# Patient Record
Sex: MALE | Race: WHITE | NOT HISPANIC OR LATINO | RURAL
[De-identification: names, ages, dates, MRNs, and addresses within clinical notes are randomized per-mention and may not be internally consistent; named-entity substitution may affect disease eponyms.]

---

## 2017-02-17 ENCOUNTER — OFFICE VISIT (OUTPATIENT)
Dept: RETAIL CLINIC | Facility: CLINIC | Age: 47
End: 2017-02-17

## 2017-02-17 VITALS
HEIGHT: 69 IN | HEART RATE: 110 BPM | TEMPERATURE: 98.5 F | BODY MASS INDEX: 38.95 KG/M2 | RESPIRATION RATE: 16 BRPM | OXYGEN SATURATION: 98 % | WEIGHT: 263 LBS

## 2017-02-17 DIAGNOSIS — L50.9 HIVES: Primary | ICD-10-CM

## 2017-02-17 PROCEDURE — 99202 OFFICE O/P NEW SF 15 MIN: CPT | Performed by: NURSE PRACTITIONER

## 2017-02-17 RX ORDER — PREDNISONE 10 MG/1
TABLET ORAL DAILY
Qty: 21 EACH | Refills: 0 | Status: SHIPPED | OUTPATIENT
Start: 2017-02-17 | End: 2017-02-23

## 2017-02-17 NOTE — PROGRESS NOTES
"Subjective   Vineet Moreno is a 46 y.o. male.     Urticaria   This is a new problem. The current episode started yesterday. The problem has been waxing and waning since onset. Location: bilat hands, upper legs and face. The rash is characterized by redness, itchiness and swelling. It is unknown if there was an exposure to a precipitant. Pertinent negatives include no fever. Past treatments include antihistamine. The treatment provided mild relief. His past medical history is significant for allergies. There is no history of asthma or eczema.        The following portions of the patient's history were reviewed and updated as appropriate: allergies, current medications, past family history, past medical history, past social history, past surgical history and problem list.    Review of Systems   Constitutional: Negative.  Negative for fever.   Respiratory: Negative.    Cardiovascular: Negative.    Skin: Positive for rash (began on hands, spread to upper thighs and face, improved with benadryl, but returned this morning).        Visit Vitals   • Pulse 110   • Temp 98.5 °F (36.9 °C)   • Resp 16   • Ht 69\" (175.3 cm)   • Wt 263 lb (119 kg)   • SpO2 98%   • BMI 38.84 kg/m2        Objective   Physical Exam   Constitutional: He is oriented to person, place, and time. He appears well-developed and well-nourished. No distress.   Pulmonary/Chest: Effort normal and breath sounds normal.   Neurological: He is alert and oriented to person, place, and time.   Skin: Skin is warm and dry. Rash (multiple macular lesion on bilat hand and forehead, blanching, no exudate or other sign of infection) noted.        Psychiatric: He has a normal mood and affect. His behavior is normal. Thought content normal.   Vitals reviewed.      Assessment/Plan   Vineet was seen today for urticaria.    Diagnoses and all orders for this visit:    Hives  -     PredniSONE (DELTASONE) 10 MG (21) tablet pack; Take  by mouth Daily for 6 days.               "

## 2017-02-17 NOTE — PATIENT INSTRUCTIONS
Hives  Hives are itchy, red, swollen areas of the skin. They can vary in size and location on your body. Hives can come and go for hours or several days (acute hives) or for several weeks (chronic hives). Hives do not spread from person to person (noncontagious). They may get worse with scratching, exercise, and emotional stress.  CAUSES   · Allergic reaction to food, additives, or drugs.  · Infections, including the common cold.  · Illness, such as vasculitis, lupus, or thyroid disease.  · Exposure to sunlight, heat, or cold.  · Exercise.  · Stress.  · Contact with chemicals.  SYMPTOMS   · Red or white swollen patches on the skin. The patches may change size, shape, and location quickly and repeatedly.  · Itching.  · Swelling of the hands, feet, and face. This may occur if hives develop deeper in the skin.  DIAGNOSIS   Your caregiver can usually tell what is wrong by performing a physical exam. Skin or blood tests may also be done to determine the cause of your hives. In some cases, the cause cannot be determined.  TREATMENT   Mild cases usually get better with medicines such as antihistamines. Severe cases may require an emergency epinephrine injection. If the cause of your hives is known, treatment includes avoiding that trigger.   HOME CARE INSTRUCTIONS   · Avoid causes that trigger your hives.  · Take antihistamines as directed by your caregiver to reduce the severity of your hives. Non-sedating or low-sedating antihistamines are usually recommended. Do not drive while taking an antihistamine.  · Take any other medicines prescribed for itching as directed by your caregiver.  · Wear loose-fitting clothing.  · Keep all follow-up appointments as directed by your caregiver.  SEEK MEDICAL CARE IF:   · You have persistent or severe itching that is not relieved with medicine.  · You have painful or swollen joints.  SEEK IMMEDIATE MEDICAL CARE IF:   · You have a fever.  · Your tongue or lips are swollen.  · You have  trouble breathing or swallowing.  · You feel tightness in the throat or chest.  · You have abdominal pain.  These problems may be the first sign of a life-threatening allergic reaction. Call your local emergency services (911 in [place]  MAKE SURE YOU:   · Understand these instructions.  · Will watch your condition.  · Will get help right away if you are not doing well or get worse.     This information is not intended to replace advice given to you by your health care provider. Make sure you discuss any questions you have with your health care provider.     Document Released: 12/18/2006 Document Revised: 12/23/2014 Document Reviewed: 10/05/2016  [employer] Interactive Patient Education ©2016 [employer]

## 2023-03-16 ENCOUNTER — OFFICE VISIT (OUTPATIENT)
Dept: FAMILY MEDICINE CLINIC | Facility: CLINIC | Age: 53
End: 2023-03-16
Payer: COMMERCIAL

## 2023-03-16 VITALS
TEMPERATURE: 97.2 F | HEIGHT: 69 IN | OXYGEN SATURATION: 97 % | WEIGHT: 250.2 LBS | HEART RATE: 88 BPM | SYSTOLIC BLOOD PRESSURE: 147 MMHG | DIASTOLIC BLOOD PRESSURE: 94 MMHG | BODY MASS INDEX: 37.06 KG/M2

## 2023-03-16 DIAGNOSIS — R03.0 ELEVATED BLOOD PRESSURE READING IN OFFICE WITHOUT DIAGNOSIS OF HYPERTENSION: ICD-10-CM

## 2023-03-16 DIAGNOSIS — Z87.39 HISTORY OF GOUT: ICD-10-CM

## 2023-03-16 DIAGNOSIS — Z12.11 COLON CANCER SCREENING: ICD-10-CM

## 2023-03-16 DIAGNOSIS — Z11.59 NEED FOR HEPATITIS C SCREENING TEST: ICD-10-CM

## 2023-03-16 DIAGNOSIS — K92.1 HEMATOCHEZIA: ICD-10-CM

## 2023-03-16 DIAGNOSIS — B35.6 TINEA CRURIS: ICD-10-CM

## 2023-03-16 DIAGNOSIS — Z82.69 FAMILY HISTORY OF GOUT: ICD-10-CM

## 2023-03-16 DIAGNOSIS — Z72.0 SMOKELESS TOBACCO USE: ICD-10-CM

## 2023-03-16 DIAGNOSIS — Z01.818 PREOP EXAMINATION: Primary | ICD-10-CM

## 2023-03-16 DIAGNOSIS — E66.8 MODERATE OBESITY: ICD-10-CM

## 2023-03-16 DIAGNOSIS — K64.8 INTERNAL HEMORRHOIDS: ICD-10-CM

## 2023-03-16 DIAGNOSIS — F10.10 ALCOHOL CONSUMPTION BINGE DRINKING: ICD-10-CM

## 2023-03-16 PROCEDURE — 46600 DIAGNOSTIC ANOSCOPY SPX: CPT | Performed by: INTERNAL MEDICINE

## 2023-03-16 PROCEDURE — 99204 OFFICE O/P NEW MOD 45 MIN: CPT | Performed by: INTERNAL MEDICINE

## 2023-03-16 PROCEDURE — 93000 ELECTROCARDIOGRAM COMPLETE: CPT | Performed by: INTERNAL MEDICINE

## 2023-03-16 RX ORDER — CLOTRIMAZOLE 1 %
1 CREAM (GRAM) TOPICAL 2 TIMES DAILY
Qty: 60 G | Refills: 1 | Status: SHIPPED | OUTPATIENT
Start: 2023-03-16

## 2023-03-16 RX ORDER — HYDROCORTISONE 25 MG/G
CREAM TOPICAL
Qty: 30 G | Refills: 0 | Status: SHIPPED | OUTPATIENT
Start: 2023-03-16

## 2023-03-16 NOTE — PROGRESS NOTES
Follow Up Office Visit      Date: 2023   Patient Name: Vineet Moreno  : 1970   MRN: [id number]     Chief Complaint:    Chief Complaint   Patient presents with   • Rectal Bleeding     Seems bright red. More frequent. Has seen blood in his stool       History of Present Illness: Vineet Moreno is a 52 y.o. male who is here today for introductory visit, having 2 primary concerns, 1 of which is a history off-and-on of having some hematochezia attributed to internal hemorrhoids, having had more consistent bleeding noted upon wiping with toilet paper after bowel movements for the last 3 months, but in the last month also noticing some blood mixed into the stool.  There is no pain with defecation, no abdominal pain other than occasionally has some cramps which she has had off and on for many years and for which she takes a probiotic, no nausea or vomiting, no fevers or chills, no unintended weight loss.  No family history of colon cancer or inflammatory bowel disease.  Unrelated problem relates to an itchy rash in his groin, for which he has not treated with anything.  No other acute problems identified by patient.  We did discuss his obesity, patient working from home being relatively sedentary with suboptimal diet.  Also noted his blood pressure elevated here in the office with no history of formal diagnosis of hypertension though has noted in medical care settings it always has been a little bit elevated which he attributed to stressful circumstances.  He does dip tobacco at 1 can daily for 30 years, and drinks a total of 27 beers through the course of the week, typically on weekends.    Subjective      Review of Systems:   Review of Systems    I have reviewed the patients family history, social history, past medical history, past surgical history and have updated it as appropriate.     Medications:     Current Outpatient Medications:   •  clotrimazole (LOTRIMIN) 1 % cream, Apply 1 application  "topically to the appropriate area as directed 2 (Two) Times a Day. As needed for yeast infection, Disp: 60 g, Rfl: 1  •  hydrocortisone 2.5 % cream, Apply 1 application topically to the appropriate area as directed 2 (Two) Times a Day. As needed for itch, Disp: 60 g, Rfl: 1  •  Hydrocortisone, Perianal, (ANUSOL-HC) 2.5 % rectal cream, Insert 1 g intrarectally twice daily for 15 days, Disp: 30 g, Rfl: 0    Allergies:   No Known Allergies    Objective     Physical Exam: Please see above  Vital Signs:   Vitals:    03/16/23 0906   BP: 147/94   BP Location: Right arm   Patient Position: Sitting   Cuff Size: Adult   Pulse: 88   Temp: 97.2 °F (36.2 °C)   TempSrc: Temporal   SpO2: 97%   Weight: 113 kg (250 lb 3.2 oz)   Height: 175.3 cm (69\")     Body mass index is 36.95 kg/m².  Class 2 Severe Obesity (BMI >=35 and <=39.9). Obesity-related health conditions include the following: Elevated blood pressure. Obesity is unchanged. BMI is is above average; BMI management plan is completed. We discussed portion control and increasing exercise.       Physical Exam  General: Very pleasant average statured healthy-appearing 52-year-old white male with a BMI of 36.9.  No prior BMI or weight comparison.  Head and neck: Moderate male pattern baldness with closely shaven hair, beard, oropharynx is clear with good dentition and moist membranes, neck supple with no lymphadenopathy thyromegaly masses or tenderness  Lungs are clear with no wheeze tachypnea or cough  Cardiac regular rate rhythm with no murmurs gallops or rubs  Abdomen with moderate angiogenic obesity soft and nontender with no organomegaly or masses and normal bowel sounds   exam with normal circumcised male, testes descended bilaterally with no nodules or tenderness, does have inguinal rash with erythema and some scaliness consistent with a yeast dermatitis bilaterally  Anal exam with no external hemorrhoids, digital exam with normal sphincter tone, no unusual discomfort or " palpable lesions, prostate mildly enlarged unremarkable for age with no focal nodules or tenderness  Endoscopy, Anus    Date/Time: 3/16/2023 9:48 AM  Performed by: Bill Jaffe MD  Authorized by: Bill Jaffe MD   Preparation: Patient was prepped and draped in the usual sterile fashion.  Local anesthesia used: no    Anesthesia:  Local anesthesia used: no    Sedation:  Patient sedated: no    Patient tolerance: patient tolerated the procedure well with no immediate complications  Comments: Anoscopy performed with moderately inflamed none actively bleeding internal hemorrhoids noted, no mucosal abnormalities noted otherwise, tolerated well with no immediate complications      ECG 12 Lead    Date/Time: 3/16/2023 9:49 AM  Performed by: Bill Jaffe MD  Authorized by: Bill Jaffe MD   Comparison: not compared with previous ECG   Comments: Normal sinus rhythm rate 79 with no acute or chronic abnormalities identified, no prior tracing for comparison            Results:   Labs:   No results found for: HGBA1C, CMP, CBCDIFFPANEL, CREAT, TSH     POCT Results (if applicable):   No results found for this or any previous visit.    Imaging:   No valid procedures specified.       Assessment / Plan      Assessment/Plan:   Diagnoses and all orders for this visit:    1. Preop examination (Primary)  -     ECG 12 Lead  -     CBC & Differential; Future  -     Comprehensive Metabolic Panel; Future  Patient being referred for screening colonoscopy, never having had prior study, also having some acute hematochezia which is likely due to internal hemorrhoids but need to review to make sure no other abnormalities noted.  Basic screening labs.  EKG unremarkable  2. Hematochezia  -     Ambulatory Referral For Screening Colonoscopy  -     Endoscopy, Anus  -     Hydrocortisone, Perianal, (ANUSOL-HC) 2.5 % rectal cream; Insert 1 g intrarectally twice daily for 15 days  Dispense: 30 g; Refill: 0  Internal hemorrhoids noted on  anoscopy today here in the office, treat with Anusol cream twice daily x2 weeks.  Advised if not improving.  Referring for colonoscopy as noted.     3. Internal hemorrhoids  -     Ambulatory Referral For Screening Colonoscopy  -     Endoscopy, Anus  -     Hydrocortisone, Perianal, (ANUSOL-HC) 2.5 % rectal cream; Insert 1 g intrarectally twice daily for 15 days  Dispense: 30 g; Refill: 0  Treating with Anusol rectal cream as noted.  Colonoscopy referral pending.  4. Tinea cruris  -     clotrimazole (LOTRIMIN) 1 % cream; Apply 1 application topically to the appropriate area as directed 2 (Two) Times a Day. As needed for yeast infection  Dispense: 60 g; Refill: 1  -     hydrocortisone 2.5 % cream; Apply 1 application topically to the appropriate area as directed 2 (Two) Times a Day. As needed for itch  Dispense: 60 g; Refill: 1  Treat with clotrimazole for his tinea cruris, adding hydrocortisone for secondary itch.  5. Elevated blood pressure reading in office without diagnosis of hypertension  -     ECG 12 Lead  -     TSH Rfx On Abnormal To Free T4; Future  -     Hemoglobin A1c; Future  -     Lipid Panel; Future  -     Urinalysis With Culture If Indicated -; Future  EKG today unremarkable.  Start monitoring blood pressures at least twice weekly with ideal parameters discussed, initiating healthy lifestyle with diet exercise avoidance of salt and attempts at weight loss.  Reassess blood pressure at follow-up visit making determination at that time whether or not he would benefit from initiation of antihypertensive therapy  6. Moderate obesity  -     TSH Rfx On Abnormal To Free T4; Future  -     Hemoglobin A1c; Future  -     Lipid Panel; Future  Discussed healthy lifestyle requirements including diet exercise and attempts at weight loss.  7. History of gout  -     Uric Acid; Future  Check uric acid.  8. Family history of gout    9. Smokeless tobacco use  30-year history of consuming 1 can of smokeless tobacco daily.   Advised to discontinue use for health risk reduction  10. Alcohol consumption binge drinking  Consuming 27 beers through the course of the week, typically weekend binge drinking.  Advised to reduce his consumption substantially to no more than 1 or 2 drinks several times weekly.  11. Colon cancer screening  -     Ambulatory Referral For Screening Colonoscopy    12. Need for hepatitis C screening test  -     Hepatitis C Antibody; Future    50 minutes spent with patient reviewing all of his concerns and identified problems, performing physical exam, performing anoscopy, review of EKG, and documentation in his medical record.    Follow Up:   Return in about 3 months (around 6/16/2023) for Annual physical, Labs today.      At [facility], we believe that sharing information builds trust and better relationships. You are receiving this note because you recently visited [facility]. It is possible you will see health information before a provider has talked with you about it. This kind of information can be easy to misunderstand. To help you fully understand what it means for your health, we urge you to discuss this note with your provider.    Bill Jaffe MD  [facility]

## 2023-03-16 NOTE — PROGRESS NOTES
Venipuncture Blood Specimen Collection  Venipuncture performed in left arm by Radha Boothe MA with good hemostasis. Patient tolerated the procedure well without complications.   03/16/23   Radha Boothe MA

## 2023-03-17 ENCOUNTER — TELEPHONE (OUTPATIENT)
Dept: FAMILY MEDICINE CLINIC | Facility: CLINIC | Age: 53
End: 2023-03-17
Payer: COMMERCIAL

## 2023-03-17 DIAGNOSIS — E11.65 INADEQUATELY CONTROLLED DIABETES MELLITUS: Primary | ICD-10-CM

## 2023-03-17 DIAGNOSIS — E78.2 MIXED HYPERLIPIDEMIA: ICD-10-CM

## 2023-03-17 LAB
ALBUMIN SERPL-MCNC: 4.5 G/DL (ref 3.8–4.9)
ALBUMIN/GLOB SERPL: 1.3 {RATIO} (ref 1.2–2.2)
ALP SERPL-CCNC: 93 IU/L (ref 44–121)
ALT SERPL-CCNC: 68 IU/L (ref 0–44)
APPEARANCE UR: CLEAR
AST SERPL-CCNC: 55 IU/L (ref 0–40)
BACTERIA #/AREA URNS HPF: NORMAL /[HPF]
BASOPHILS # BLD AUTO: 0 X10E3/UL (ref 0–0.2)
BASOPHILS NFR BLD AUTO: 1 %
BILIRUB SERPL-MCNC: 0.7 MG/DL (ref 0–1.2)
BILIRUB UR QL STRIP: NEGATIVE
BUN SERPL-MCNC: 11 MG/DL (ref 6–24)
BUN/CREAT SERPL: 11 (ref 9–20)
CALCIUM SERPL-MCNC: 9.5 MG/DL (ref 8.7–10.2)
CASTS URNS QL MICRO: NORMAL /LPF
CHLORIDE SERPL-SCNC: 99 MMOL/L (ref 96–106)
CHOLEST SERPL-MCNC: 286 MG/DL (ref 100–199)
CO2 SERPL-SCNC: 21 MMOL/L (ref 20–29)
COLOR UR: YELLOW
CREAT SERPL-MCNC: 0.96 MG/DL (ref 0.76–1.27)
EGFRCR SERPLBLD CKD-EPI 2021: 96 ML/MIN/1.73
EOSINOPHIL # BLD AUTO: 0.1 X10E3/UL (ref 0–0.4)
EOSINOPHIL NFR BLD AUTO: 2 %
EPI CELLS #/AREA URNS HPF: NORMAL /HPF (ref 0–10)
ERYTHROCYTE [DISTWIDTH] IN BLOOD BY AUTOMATED COUNT: 12.2 % (ref 11.6–15.4)
GLOBULIN SER CALC-MCNC: 3.4 G/DL (ref 1.5–4.5)
GLUCOSE SERPL-MCNC: 176 MG/DL (ref 70–99)
GLUCOSE UR QL STRIP: NEGATIVE
HBA1C MFR BLD: 7.4 % (ref 4.8–5.6)
HCT VFR BLD AUTO: 49.9 % (ref 37.5–51)
HCV IGG SERPL QL IA: NON REACTIVE
HDLC SERPL-MCNC: 42 MG/DL
HGB BLD-MCNC: 16.8 G/DL (ref 13–17.7)
HGB UR QL STRIP: NEGATIVE
IMM GRANULOCYTES # BLD AUTO: 0 X10E3/UL (ref 0–0.1)
IMM GRANULOCYTES NFR BLD AUTO: 0 %
KETONES UR QL STRIP: NEGATIVE
LDLC SERPL CALC-MCNC: 185 MG/DL (ref 0–99)
LEUKOCYTE ESTERASE UR QL STRIP: NEGATIVE
LYMPHOCYTES # BLD AUTO: 1.3 X10E3/UL (ref 0.7–3.1)
LYMPHOCYTES NFR BLD AUTO: 20 %
MCH RBC QN AUTO: 32.1 PG (ref 26.6–33)
MCHC RBC AUTO-ENTMCNC: 33.7 G/DL (ref 31.5–35.7)
MCV RBC AUTO: 95 FL (ref 79–97)
MICRO URNS: NORMAL
MICRO URNS: NORMAL
MONOCYTES # BLD AUTO: 0.6 X10E3/UL (ref 0.1–0.9)
MONOCYTES NFR BLD AUTO: 10 %
NEUTROPHILS # BLD AUTO: 4.5 X10E3/UL (ref 1.4–7)
NEUTROPHILS NFR BLD AUTO: 67 %
NITRITE UR QL STRIP: NEGATIVE
PH UR STRIP: 6.5 [PH] (ref 5–7.5)
PLATELET # BLD AUTO: 152 X10E3/UL (ref 150–450)
POTASSIUM SERPL-SCNC: 4.5 MMOL/L (ref 3.5–5.2)
PROT SERPL-MCNC: 7.9 G/DL (ref 6–8.5)
PROT UR QL STRIP: NORMAL
RBC # BLD AUTO: 5.23 X10E6/UL (ref 4.14–5.8)
RBC #/AREA URNS HPF: NORMAL /HPF (ref 0–2)
SODIUM SERPL-SCNC: 139 MMOL/L (ref 134–144)
SP GR UR STRIP: 1.02 (ref 1–1.03)
TRIGL SERPL-MCNC: 304 MG/DL (ref 0–149)
TSH SERPL DL<=0.005 MIU/L-ACNC: 4.48 UIU/ML (ref 0.45–4.5)
URATE SERPL-MCNC: 7.6 MG/DL (ref 3.8–8.4)
URINALYSIS REFLEX: NORMAL
UROBILINOGEN UR STRIP-MCNC: 0.2 MG/DL (ref 0.2–1)
VLDLC SERPL CALC-MCNC: 59 MG/DL (ref 5–40)
WBC # BLD AUTO: 6.7 X10E3/UL (ref 3.4–10.8)
WBC #/AREA URNS HPF: NORMAL /HPF (ref 0–5)

## 2023-03-17 RX ORDER — METFORMIN HYDROCHLORIDE 500 MG/1
TABLET, EXTENDED RELEASE ORAL
Qty: 180 TABLET | Refills: 1 | Status: SHIPPED | OUTPATIENT
Start: 2023-03-17

## 2023-03-17 RX ORDER — ATORVASTATIN CALCIUM 20 MG/1
20 TABLET, FILM COATED ORAL NIGHTLY
Qty: 90 TABLET | Refills: 1 | Status: SHIPPED | OUTPATIENT
Start: 2023-03-17

## 2023-03-17 NOTE — TELEPHONE ENCOUNTER
Phone conversation with patient regarding lab testing submitted from yesterday, 3/16/2023 as follows:    Hemoglobin A1c elevated at 7.4%  Total cholesterol 286, triglyceride 304, HDL 42,   Urinalysis normal  CBC normal  TSH normal  Uric acid very minimally elevated at 7.6  Hep C negative  CMP with a blood glucose elevated at 176, AST mildly elevated at 55, ALT mildly elevated at 88, otherwise unremarkable specifically creatinine 0.96 and GFR of 96    Assessment/plan:  1.  New diagnosis diabetes mellitus type 2.  Emphasize healthy lifestyle diet and exercise, will initiate metformin  mg, to take 1 tablet with evening meal for 1 week then increase to 2 tablets with evening meal, also plan to: Glucometer with recommendation to check blood sugar twice weekly both mornings and evenings with ideal parameters of 100 or less discussed.  Plan to repeat another hemoglobin A1c in 3 months.  We will also check urine microalbumin at his follow-up visit noting he is not currently taking any ACE inhibitor or ARB.  2.  Moderate hyperlipidemia.  This in conjunction with new diagnosis of diabetes mellitus type 2.  Initiate atorvastatin 20 mg nightly along with lifestyle changes and plan repeating lipid profile in 3 months.  3.  Mild elevated liver transaminases.  Very likely related to his fatty liver/steatohepatitis, noting he has a negative hep C titer acutely, but we will plan to repeat LFTs again along with full viral hepatitis profile at his follow-up visit, and assuming still elevated then pursue right upper quadrant ultrasound to assess the liver.  4.  Remainder of laboratory testing otherwise satisfactory.  5.  Follow-up as scheduled in 3 months for review planning at that time to repeat a CMP, fast lipid profile, hemoglobin A1c, UA and urine microalbumin, along with viral hepatitis profile, and potentially right upper quadrant ultrasound.

## 2023-03-21 ENCOUNTER — TELEPHONE (OUTPATIENT)
Dept: FAMILY MEDICINE CLINIC | Facility: CLINIC | Age: 53
End: 2023-03-21
Payer: COMMERCIAL

## 2023-03-21 DIAGNOSIS — R73.03 PREDIABETES: Primary | ICD-10-CM

## 2023-03-21 RX ORDER — LANCETS
EACH MISCELLANEOUS
Qty: 100 EACH | Refills: 3 | Status: SHIPPED | OUTPATIENT
Start: 2023-03-21

## 2023-03-21 RX ORDER — BLOOD-GLUCOSE METER
1 EACH MISCELLANEOUS DAILY
Qty: 1 KIT | Refills: 0 | Status: SHIPPED | OUTPATIENT
Start: 2023-03-21

## 2023-06-16 ENCOUNTER — OFFICE VISIT (OUTPATIENT)
Dept: FAMILY MEDICINE CLINIC | Facility: CLINIC | Age: 53
End: 2023-06-16
Payer: COMMERCIAL

## 2023-06-16 VITALS
TEMPERATURE: 98.3 F | OXYGEN SATURATION: 97 % | HEIGHT: 69 IN | BODY MASS INDEX: 36.08 KG/M2 | HEART RATE: 105 BPM | WEIGHT: 243.6 LBS | DIASTOLIC BLOOD PRESSURE: 88 MMHG | SYSTOLIC BLOOD PRESSURE: 145 MMHG

## 2023-06-16 DIAGNOSIS — Z72.0 SMOKELESS TOBACCO USE: ICD-10-CM

## 2023-06-16 DIAGNOSIS — R79.89 ABNORMAL LIVER FUNCTION TEST: ICD-10-CM

## 2023-06-16 DIAGNOSIS — Z12.11 SCREEN FOR COLON CANCER: ICD-10-CM

## 2023-06-16 DIAGNOSIS — Z00.01 ENCOUNTER FOR GENERAL ADULT MEDICAL EXAMINATION WITH ABNORMAL FINDINGS: Primary | ICD-10-CM

## 2023-06-16 DIAGNOSIS — K02.9 DENTAL CAVITY: ICD-10-CM

## 2023-06-16 DIAGNOSIS — Z12.5 SCREENING FOR PROSTATE CANCER: ICD-10-CM

## 2023-06-16 DIAGNOSIS — F10.10 ALCOHOL CONSUMPTION BINGE DRINKING: ICD-10-CM

## 2023-06-16 DIAGNOSIS — E11.42 DIABETIC POLYNEUROPATHY ASSOCIATED WITH TYPE 2 DIABETES MELLITUS: ICD-10-CM

## 2023-06-16 DIAGNOSIS — E78.5 DYSLIPIDEMIA: ICD-10-CM

## 2023-06-16 DIAGNOSIS — K64.8 INTERNAL HEMORRHOIDS: ICD-10-CM

## 2023-06-16 DIAGNOSIS — I10 BENIGN HYPERTENSION: ICD-10-CM

## 2023-06-16 DIAGNOSIS — E66.8 MODERATE OBESITY: ICD-10-CM

## 2023-06-16 PROBLEM — E11.65 INADEQUATELY CONTROLLED DIABETES MELLITUS: Status: ACTIVE | Noted: 2023-06-16

## 2023-06-16 PROBLEM — E11.9 TYPE 2 DIABETES MELLITUS WITHOUT COMPLICATION, WITHOUT LONG-TERM CURRENT USE OF INSULIN: Status: ACTIVE | Noted: 2023-06-16

## 2023-06-16 LAB
EXPIRATION DATE: NORMAL
GLUCOSE BLDC GLUCOMTR-MCNC: 128 MG/DL (ref 70–130)
HBA1C MFR BLD: 6.2 %
Lab: NORMAL

## 2023-06-16 RX ORDER — POLYETHYLENE GLYCOL 3350 17 G/17G
POWDER, FOR SOLUTION ORAL
COMMUNITY
Start: 2023-05-24

## 2023-06-16 RX ORDER — AMLODIPINE AND OLMESARTAN MEDOXOMIL 5; 20 MG/1; MG/1
1 TABLET ORAL DAILY
Qty: 90 TABLET | Refills: 3 | Status: SHIPPED | OUTPATIENT
Start: 2023-06-16

## 2023-06-16 RX ORDER — BISACODYL 5 MG/1
TABLET, SUGAR COATED ORAL
COMMUNITY
Start: 2023-05-24

## 2023-06-16 RX ORDER — HYDROCORTISONE 25 MG/G
CREAM TOPICAL 2 TIMES DAILY
Qty: 30 G | Refills: 0 | Status: SHIPPED | OUTPATIENT
Start: 2023-06-16

## 2023-06-16 NOTE — PROGRESS NOTES
Male Physical Note      Date: 2023   Patient Name: Vineet Moreno  : 1970   MRN: [id number]     Chief Complaint:    Chief Complaint   Patient presents with    Annual Exam       History of Present Illness: Vineet Moreno is a 52 y.o. male who is here today for their annual health maintenance and physical.  Patient seen 3 months ago with subsequent diagnosis of diabetes mellitus type 2 having had a hemoglobin A1c of 7.4%, subsequently started on metformin  mg at 2 tablets daily with blood sugars generally averaging in the 100s to 140s checked at least daily.  Patient does describe some tingling in his feet which has improved over the last 3 months.  Also we been monitoring his blood pressure off medication with blood pressures averaging 140s to 150s over 80s to low 90s checked 3 times weekly over the last 3 months.  He also had hyperlipidemia noted and was started on atorvastatin 20 mg nightly.  He did have some elevated liver function studies presumably due to steatohepatitis with follow-up testing to be performed.  He also did have some internal hemorrhoids with bleeding documented by anoscopy and have been prescribed Anusol cream with excellent results but has now had some recurrent bleeding given lifting some heavy furniture recently.  He had been referred for colonoscopy but postponed this until next month for some conflicting schedule, with the study being scheduled for 2023.  History of gout with normal uric acid 3 months ago.  Does consume 5 cans of smokeless tobacco weekly, and drinks about 24 beers on weekends.  We also addressed his obesity, patient down 7 pounds in the last 3 months with lifestyle changes eating healthier foods with some exercise.      Subjective      Review of Systems:   Review of Systems    Past Medical History, Social History, Family History and Care Team were all reviewed with patient and updated as appropriate.     Medications:     Current Outpatient  Medications:     atorvastatin (Lipitor) 20 MG tablet, Take 1 tablet by mouth Every Night. For elevated cholesterol, Disp: 90 tablet, Rfl: 1    EQ Gentle Laxative 5 MG EC tablet, TAKE 2 TABLETS BY MOUTH AS DIRECTED FOR 1 DAY FOR BOWEL PREP, Disp: , Rfl:     metFORMIN ER (GLUCOPHAGE-XR) 500 MG 24 hr tablet, 2 tablets with evening meal for diabetes, Disp: 180 tablet, Rfl: 1    polyethylene glycol (MIRALAX) 17 GM/SCOOP powder, MIX 10 CAPFULS IN 32 OUNCES OF GATORADE AND DRINK AT 4 PM THE DAY BEFORE PROCEDURE AND THEN REPEAT AT 4 AM ON THE MORNING OF THE PROCEDURE, Disp: , Rfl:     amlodipine-olmesartan (Arleen) 5-20 MG per tablet, Take 1 tablet by mouth Daily., Disp: 90 tablet, Rfl: 3    Hydrocortisone, Perianal, (ANUSOL-HC) 2.5 % rectal cream, Insert  into the rectum 2 (Two) Times a Day. Insert 1 gram intrarectally twice daily for 15 days, Disp: 30 g, Rfl: 0    Allergies:   No Known Allergies    Immunizations:  Health Maintenance Summary            Ordered - URINE MICROALBUMIN (Yearly) Ordered on 6/16/2023      No completion, postpone, or frequency change history exists for this topic.              Postponed - COLORECTAL CANCER SCREENING (COLONOSCOPY - Every 10 Years) Postponed until 7/20/2023 06/16/2023  Postponed until 7/20/2023 by Alpa Ann (Pending event)              Postponed - ZOSTER VACCINE (1 of 2) Postponed until 9/8/2023 03/16/2023  Postponed until 9/8/2023 by Alpa Ann (Not Indicated)              Postponed - DIABETIC FOOT EXAM (Yearly) Postponed until 12/13/2023 06/16/2023  Postponed until 12/13/2023 by Alpa Ann (Pending event)              Postponed - TDAP/TD VACCINES (1 - Tdap) Postponed until 12/20/2023 06/16/2023  Postponed until 12/20/2023 by Alpa Ann (Pending event)    03/16/2023  Postponed until 6/12/2023 by Alpa Ann (Pending event)              Postponed - Hepatitis B (1 of 3 - 3-dose series) Postponed until 12/25/2023 06/16/2023   Postponed until 12/25/2023 by Alpa Ann (Pending event)              Postponed - DIABETIC EYE EXAM (Yearly) Postponed until 12/25/2023 06/16/2023  Postponed until 12/25/2023 by Alpa Ann (Pending event)              Postponed - Pneumococcal Vaccine 0-64 (1 - PCV) Postponed until 12/26/2023 06/16/2023  Postponed until 12/26/2023 by Alpa Ann (Pending event)              Postponed - COVID-19 Vaccine (2 - Booster for Malachi series) Postponed until 6/19/2024 06/16/2023  Postponed until 6/19/2024 by Alpa Ann (Patient Refused)    03/16/2023  Postponed until 3/18/2023 by Alpa Ann (Patient Refused)    05/04/2021  Imm Admin: COVID-19 (MALACHI)              INFLUENZA VACCINE (Yearly - October to March) Next due on 10/1/2023      03/16/2023  Postponed until 3/31/2023 by Alpa Ann (Patient Refused)              HEMOGLOBIN A1C (Every 6 Months) Next due on 12/16/2023 06/16/2023  Hemoglobin A1C component of POC Glycosylated Hemoglobin (Hb A1C)    03/16/2023  Hemoglobin A1C component of Hemoglobin A1c              ANNUAL PHYSICAL (Yearly) Next due on 6/16/2024 06/16/2023  Done              HEPATITIS C SCREENING  Completed      03/16/2023  Hep C Virus Ab component of Hepatitis C Antibody                    No orders of the defined types were placed in this encounter.      Colorectal Screening:   Colonoscopy scheduled for 7/17/2023  Last Completed Colonoscopy       This patient has no relevant Health Maintenance data.          CT for Smoker (Age 50-80, 20pk yr within last 15 years): N/A  Bone Density/DEXA (high risk): N/A  Hep C (Age 18-79 once): Normal  HIV (Age 15-65 once) : N/A  PSA (Over age 50, C Level Recommendation): Pending  US Aorta (For male smokers, age 65): N/A  A1c:   Hemoglobin A1C   Date Value Ref Range Status   06/16/2023 6.2 % Final     Lipid panel: No results found for: LABLIPI    The 10-year ASCVD risk score (Shiva DK, et al., 2019) is:  "19.5%    Values used to calculate the score:      Age: 52 years      Sex: Male      Is Non- : No      Diabetic: Yes      Tobacco smoker: No      Systolic Blood Pressure: 145 mmHg      Is BP treated: Yes      HDL Cholesterol: 42 mg/dL      Total Cholesterol: 286 mg/dL    Dermatology: N/A  Ophthalmologist: Recommended regular checkup  Dentist: Recommended regular checkup    Tobacco Use: High Risk    Smoking Tobacco Use: Never    Smokeless Tobacco Use: Current    Passive Exposure: Not on file       Social History     Substance and Sexual Activity   Alcohol Use Yes    Alcohol/week: 27.0 standard drinks    Types: 27 Cans of beer per week        Social History     Substance and Sexual Activity   Drug Use Never        Diet/Physical activity: Improvement in diet, still sedentary    Sexual health: No problems, no contraception used or required    PHQ-2 Depression Screening  PHQ-9 Total Score: 0         Objective     Physical Exam:  Vital Signs:   Vitals:    06/16/23 1517   BP: 145/88   BP Location: Left arm   Patient Position: Sitting   Cuff Size: Adult   Pulse: 105   Temp: 98.3 °F (36.8 °C)   TempSrc: Temporal   SpO2: 97%   Weight: 110 kg (243 lb 9.6 oz)   Height: 175.3 cm (69\")     Body mass index is 35.97 kg/m².     Physical Exam  Vitals and nursing note reviewed.   Constitutional:       Appearance: Normal appearance. He is normal weight. He is obese.      Comments: Pleasant, healthy, moderately obese with BMI of 35.9 with 7 pound weight loss in the last 3 months, no acute distress, alert and oriented, fluent speech   HENT:      Head: Normocephalic and atraumatic.      Right Ear: Tympanic membrane, ear canal and external ear normal.      Left Ear: Tympanic membrane, ear canal and external ear normal.      Nose: Nose normal.      Mouth/Throat:      Mouth: Mucous membranes are moist.      Pharynx: Oropharynx is clear.      Comments: Cavity right maxillary molar, gum erosion diffusely  Eyes:      " Extraocular Movements: Extraocular movements intact.      Conjunctiva/sclera: Conjunctivae normal.      Pupils: Pupils are equal, round, and reactive to light.   Neck:      Vascular: No carotid bruit.      Comments: No cervical adenopathy or masses, no periclavicular or axillary or inguinal adenopathy, neck with full range of motion  Cardiovascular:      Rate and Rhythm: Normal rate and regular rhythm.      Pulses: Normal pulses.      Heart sounds: Normal heart sounds. No murmur heard.    No friction rub. No gallop.      Comments: 2+ carotids without bruits, 2+ radial pulses, 2+ femoral pulses without bruits, 1-2+ DP and 1+ PT pulses bilaterally having a small amount of nonpitting stasis edema behind his ankles bilaterally  Pulmonary:      Effort: Pulmonary effort is normal.      Breath sounds: Normal breath sounds.      Comments: No cough wheeze or dyspnea  Abdominal:      General: Bowel sounds are normal.      Palpations: Abdomen is soft. There is no mass.      Tenderness: There is no abdominal tenderness. There is no guarding or rebound.      Comments: Moderate centripetal obesity   Genitourinary:     Comments: Deferred today as performed 3 to go with normal  exam, rectal exam with moderate inflamed internal hemorrhoids on anoscopy, prostate mildly enlarged with no nodules at that time  Musculoskeletal:         General: No swelling, tenderness or deformity. Normal range of motion.      Cervical back: Normal range of motion and neck supple. No rigidity or tenderness.      Right lower leg: No edema.      Left lower leg: No edema.   Lymphadenopathy:      Cervical: No cervical adenopathy.   Skin:     General: Skin is warm and dry.      Capillary Refill: Capillary refill takes less than 2 seconds.      Findings: No lesion or rash.   Neurological:      General: No focal deficit present.      Mental Status: He is alert and oriented to person, place, and time. Mental status is at baseline.      Comments: Bipedal exam  with 1-2+ DP and 1+ PT pulses bilaterally with some mild nonpitting stasis edema behind both ankles, normal sensation objectively to fine touch vibration and pinprick, motor exam normal, no skin breakdown   Psychiatric:         Mood and Affect: Mood normal.         Behavior: Behavior normal.         Thought Content: Thought content normal.         Judgment: Judgment normal.    Diabetic Foot Exam Performed and Monofilament Test Performed     POCT Results (if applicable):   Results for orders placed or performed in visit on 06/16/23   POC Glycosylated Hemoglobin (Hb A1C)    Specimen: Blood   Result Value Ref Range    Hemoglobin A1C 6.2 %    Lot Number [id number]     Expiration Date 02/28/2025    POC Glucose    Specimen: Blood   Result Value Ref Range    Glucose 128 (A) 70 - 130 mg/dL       Procedures    Assessment / Plan      Assessment/Plan:   Diagnoses and all orders for this visit:    1. Encounter for general adult medical examination with abnormal findings (Primary)  -     amlodipine-olmesartan (Arleen) 5-20 MG per tablet; Take 1 tablet by mouth Daily.  Dispense: 90 tablet; Refill: 3  -     Comprehensive Metabolic Panel; Future  -     VIRAL HEPATITIS HBV, HCV; Future  -     Lipid Panel; Future  -     MicroAlbumin, Urine, Random - Urine, Clean Catch; Future  -     PSA Screen; Future  52-year-old male with health problems as detailed below, having improvement in diabetic control, elevated blood pressures with plan to initiate antihypertensive as detailed below, obesity which has been improved with lifestyle change, internal hemorrhoids and secondary hematochezia risks estimated as detailed below, patient declining recommendation for Tdap, Shingrix, COVID-19 bivalent booster, and Prevnar 20, will check to see if he has had hepatitis B vaccine in the past.  Given colonoscopy referral 3 months ago, having been postponed from earlier this month, scheduled for 7/17/2023.  2. Diabetic polyneuropathy associated with type 2  diabetes mellitus  -     POC Glycosylated Hemoglobin (Hb A1C)  -     POC Glucose  -     MicroAlbumin, Urine, Random - Urine, Clean Catch; Future  Initial diagnosis 3 months ago with a hemoglobin A1c 7.4%, improved to 6.2% today taking metformin  mg at 2 tablets daily.  Has some mild neuropathy manifested by subjective tingling in his feet with no objective abnormalities on his exam.  Initiating ARB in his blood pressure medication as detailed below and will check a urine microalbumin  3. Benign hypertension  -     amlodipine-olmesartan (Arleen) 5-20 MG per tablet; Take 1 tablet by mouth Daily.  Dispense: 90 tablet; Refill: 3  -     Comprehensive Metabolic Panel; Future  Patient brings in excellent records over the last 3 months documenting stage II hypertension, having made lifestyle changes with healthy diet though still limited exercise.  Based upon his documentation will initiate antihypertensive therapy with amlodipine/olmesartan 5/20 mg daily, patient to pursue healthy lifestyle monitor blood pressures as doing with ideal parameters discussed.  Reassess clinically in 3 months.  We will check follow-up labs including renal function and electrolytes in the next couple weeks as detailed, waiting that time period given initiation of ARB.  Note EKG was performed on 3/16/2023 completely normal.  Not repeated today.  4. Dyslipidemia  -     Lipid Panel; Future  Started on atorvastatin 20 mg nightly 3 months ago.  Update lab testing in the next couple weeks.  5. Moderate obesity  Patient has initiated lifestyle changes with primarily diet, advised to continue same and also to significantly reduce his alcohol intake on weekends which will help with weight and health risks, increase exercise.  Did discuss concept of GLP-1 agonist for concomitant diabetic benefits but he wishes to hold off for now.  6. Smokeless tobacco use  Advised strongly of need to discontinue habit for health risk reduction.  7. Alcohol  consumption binge drinking  As noted above, advised to significantly curtail his alcohol consumption for both calorie restriction and health benefits.  8. Abnormal liver function test  -     Comprehensive Metabolic Panel; Future  -     VIRAL HEPATITIS HBV, HCV; Future  Mildly elevated liver transaminases on testing 3 months ago.  Repeat testing and add viral hepatitis profile.  Likely related to steatohepatitis.  9. Dental cavity  Right maxillary molar cavity.  Patient scheduled for dental follow-up in the near future.  Discussed appropriate dental hygiene.  10. Internal hemorrhoids  -     Hydrocortisone, Perianal, (ANUSOL-HC) 2.5 % rectal cream; Insert  into the rectum 2 (Two) Times a Day. Insert 1 gram intrarectally twice daily for 15 days  Dispense: 30 g; Refill: 0  Current symptoms of hematochezia having had documented internal hemorrhoids on anoscopy 3 months ago.  We will represcribe the Anusol intrarectal cream, with patient scheduled to undergo colonoscopy in the next month.  11. Screen for colon cancer  Initial colonoscopy screen had been scheduled for earlier this month, rescheduled for 7/17/2023.  12. Screening for prostate cancer  -     PSA Screen; Future  Obtain screening PSA.       Healthcare Maintenance:  Counseling provided based on age appropriate USPSTF guidelines.       Vineet Moreno voices understanding and acceptance of this advice and will call back with any further questions or concerns. AVS with preventive healthcare tips printed for patient.       Follow Up:   Return in about 3 months (around 9/16/2023) for Recheck.    At [facility], we believe that sharing information builds trust and better relationships. You are receiving this note because you recently visited [facility]. It is possible you will see health information before a provider has talked with you about it. This kind of information can be easy to misunderstand. To help you fully understand what it means for your health, we  urge you to discuss this note with your provider.    Bill Jaffe MD  [facility]

## 2023-09-28 ENCOUNTER — OFFICE VISIT (OUTPATIENT)
Dept: FAMILY MEDICINE CLINIC | Facility: CLINIC | Age: 53
End: 2023-09-28
Payer: COMMERCIAL

## 2023-09-28 VITALS
WEIGHT: 250.4 LBS | DIASTOLIC BLOOD PRESSURE: 82 MMHG | TEMPERATURE: 98.8 F | BODY MASS INDEX: 37.09 KG/M2 | HEART RATE: 84 BPM | HEIGHT: 69 IN | SYSTOLIC BLOOD PRESSURE: 140 MMHG | OXYGEN SATURATION: 97 %

## 2023-09-28 DIAGNOSIS — E78.2 MIXED HYPERLIPIDEMIA: ICD-10-CM

## 2023-09-28 DIAGNOSIS — E78.5 DYSLIPIDEMIA: ICD-10-CM

## 2023-09-28 DIAGNOSIS — E66.8 MODERATE OBESITY: ICD-10-CM

## 2023-09-28 DIAGNOSIS — R79.89 ABNORMAL LFTS: ICD-10-CM

## 2023-09-28 DIAGNOSIS — Z12.5 SCREENING FOR PROSTATE CANCER: ICD-10-CM

## 2023-09-28 DIAGNOSIS — F10.10 ALCOHOL CONSUMPTION BINGE DRINKING: ICD-10-CM

## 2023-09-28 DIAGNOSIS — Z86.010 HISTORY OF COLON POLYPS: ICD-10-CM

## 2023-09-28 DIAGNOSIS — E11.42 DIABETIC POLYNEUROPATHY ASSOCIATED WITH TYPE 2 DIABETES MELLITUS: Primary | ICD-10-CM

## 2023-09-28 DIAGNOSIS — Z00.01 ENCOUNTER FOR GENERAL ADULT MEDICAL EXAMINATION WITH ABNORMAL FINDINGS: ICD-10-CM

## 2023-09-28 DIAGNOSIS — R79.89 ABNORMAL LIVER FUNCTION TEST: ICD-10-CM

## 2023-09-28 DIAGNOSIS — I10 BENIGN HYPERTENSION: ICD-10-CM

## 2023-09-28 RX ORDER — ATORVASTATIN CALCIUM 20 MG/1
20 TABLET, FILM COATED ORAL NIGHTLY
Qty: 90 TABLET | Refills: 1 | Status: SHIPPED | OUTPATIENT
Start: 2023-09-28

## 2023-09-28 RX ORDER — CHLORHEXIDINE GLUCONATE ORAL RINSE 1.2 MG/ML
SOLUTION DENTAL
COMMUNITY
Start: 2023-09-22

## 2023-09-28 RX ORDER — METFORMIN HYDROCHLORIDE 500 MG/1
TABLET, EXTENDED RELEASE ORAL
Qty: 180 TABLET | Refills: 1 | Status: SHIPPED | OUTPATIENT
Start: 2023-09-28

## 2023-09-28 NOTE — PROGRESS NOTES
Venipuncture Blood Specimen Collection  Venipuncture performed in left arm by Radha Boothe MA with good hemostasis. Patient tolerated the procedure well without complications.   09/28/23   Radha Boothe MA

## 2023-09-28 NOTE — PROGRESS NOTES
Follow Up Office Visit      Date: 2023   Patient Name: Vineet Moreno  : 1970   MRN: [id number]     Chief Complaint:    Chief Complaint   Patient presents with    Follow-up       History of Present Illness: Vineet Moreno is a 52 y.o. male who is here today for 3-month review, at that time having had initiation of amlodipine/olmesartan 5/20 mg daily given hypertension, with subsequently blood pressures averaging 110s to 140s over 80s which is an improvement.  No side effects of the medication and denies chest pains palpitations dyspnea dizziness or edema.  He also has diabetes type 2 with associated neuropathy, the latter manifested by occasionally tingling in his feet, taking metformin  mg at 2 tablets daily along with an ARB, last hemoglobin A1c 6.2% in 2023, noting morning blood sugars typically in the 100s and nonfasting afternoon blood sugars typically in the 130s to 140s.  Fair diet, no significant exercise program, has not gotten a diabetic eye evaluation.  He also has history of obesity, with suboptimal lifestyle, having gained 7 pounds in weight in the last 3 months.  He is not interested in this time and assistance with weight loss medication and specifically GLP-1 agonists, preferring to work on lifestyle initially.  He also had mild elevation of his liver transaminases noted on testing in 3/2023, did not get recommended follow-up testing in 2023.  He does binge alcohol drinking typically 24 beer on weekends, indicating rarely drinks alcohol during the week.  He did receive a colonoscopy in 2023 which revealed multiple polyps including one greater than 1 cm but no precancerous changes, with 3-year follow-up recommended with Dr. Booker.  Not having any GI symptoms.    Subjective      Review of Systems:   Review of Systems    I have reviewed the patients family history, social history, past medical history, past surgical history and have updated it as appropriate.  "    Medications:     Current Outpatient Medications:     amlodipine-olmesartan (Arleen) 5-20 MG per tablet, Take 1 tablet by mouth Daily., Disp: 90 tablet, Rfl: 3    atorvastatin (Lipitor) 20 MG tablet, Take 1 tablet by mouth Every Night. For elevated cholesterol, Disp: 90 tablet, Rfl: 1    chlorhexidine (PERIDEX) 0.12 % solution, RINSE WITH 15ML FOR 2 MINUTES AND SPIT, USE TWICE DAILY AFTER BRUSHING TEETH., Disp: , Rfl:     metFORMIN ER (GLUCOPHAGE-XR) 500 MG 24 hr tablet, 2 tablets with evening meal for diabetes, Disp: 180 tablet, Rfl: 1    Allergies:   No Known Allergies    Objective     Physical Exam: Please see above  Vital Signs:   Vitals:    09/28/23 1257   BP: 140/82   BP Location: Left arm   Patient Position: Sitting   Cuff Size: Adult   Pulse: 84   Temp: 98.8 °F (37.1 °C)   TempSrc: Temporal   SpO2: 97%   Weight: 114 kg (250 lb 6.4 oz)   Height: 175.3 cm (69\")     Body mass index is 36.98 kg/m².          Physical Exam  General: Pleasant healthy-appearing 52-year-old male with a BMI of 36.9, reflecting 7 pound weight gain in the last 3 months  Neck supple with no masses or tenderness  Lungs are clear with no wheeze tachypnea or cough  Cardiac regular rate rhythm with no murmurs gallops rubs, no dependent edema  Abdomen with moderate obesity soft and nontender with no organomegaly or masses and normal bowel sounds  Neurological exam grossly normal, with bipedal exam not performed today given performed at his last visit in 6/2023.  Procedures    Results:   Labs:   Hemoglobin A1C   Date Value Ref Range Status   06/16/2023 6.2 % Final     TSH   Date Value Ref Range Status   03/16/2023 4.480 0.450 - 4.500 uIU/mL Final        POCT Results (if applicable):   Results for orders placed or performed in visit on 06/16/23   POC Glycosylated Hemoglobin (Hb A1C)    Specimen: Blood   Result Value Ref Range    Hemoglobin A1C 6.2 %    Lot Number [id number]     Expiration Date 02/28/2025    POC Glucose    Specimen: Blood "   Result Value Ref Range    Glucose 128 (A) 70 - 130 mg/dL     Assessment / Plan      Assessment/Plan:   Diagnoses and all orders for this visit:    1. Diabetic polyneuropathy associated with type 2 diabetes mellitus (Primary)  -     metFORMIN ER (GLUCOPHAGE-XR) 500 MG 24 hr tablet; 2 tablets with evening meal for diabetes  Dispense: 180 tablet; Refill: 1  Hemoglobin A1c satisfactory at 6.2% 3 months ago, taking metformin  mg at 2 tablets daily.  We will update testing in another 3 months, continuing healthy lifestyle efforts with diet and exercise.  Does take ARB.  Advised to update diabetic eye evaluation.  Obtain urine microalbumin as ordered 3 months ago.  2. Benign hypertension  History of antihypertensive specifically amlodipine/olmesartan 5/20 mg daily 3 months ago, overall having proved and generally satisfactory blood pressure control, continue current regimen with working on lifestyle change including diet exercise avoidance of added salt and significant reduction in his alcohol consumption.  Reassess next visit.  3. Abnormal LFTs  Mild elevation of liver transaminases noted in 3/2023 with patient failing to follow-up testing as recommended in 6/2023.  We will update a CMP and also add a viral hepatitis profile as previously ordered.  Advised that his hepatitis is very likely related to his alcohol consumption and discussed need to discontinue.  4. Alcohol consumption binge drinking  Heavy binge alcohol consumption on weekends.  Advised need to discontinue for health risk reduction.  5. Mixed hyperlipidemia  -     atorvastatin (Lipitor) 20 MG tablet; Take 1 tablet by mouth Every Night. For elevated cholesterol  Dispense: 90 tablet; Refill: 1  Update lipid profile as requested 3 months ago.  6. History of colon polyps  Colonoscopy with Dr. Booker on 7/17/2023 revealing multiple polyps including one greater than 1 cm, with no dysplastic changes.  Recommended 3-year follow-up surveillance with   Jhony.  7.  Moderate obesity  Patient prefers to work on weight loss with diet and exercise rather than pursuing GLP-1 agonist at this time.  We will discuss again options next visit depending on his success with weight loss.    Obtain labs ordered from 6/2023 including CMP, fasting lipid profile, viral hepatitis profile, PSA and urine microalbumin.    Declines recommended vaccines today including Tdap, Prevnar 20 and influenza, advised also to obtain COVID-19 and Shingrix vaccines through his pharmacy or the health department.    Follow Up:   Return in about 3 months (around 12/28/2023) for Recheck.      At [facility], we believe that sharing information builds trust and better relationships. You are receiving this note because you recently visited [facility]. It is possible you will see health information before a provider has talked with you about it. This kind of information can be easy to misunderstand. To help you fully understand what it means for your health, we urge you to discuss this note with your provider.    Bill Jaffe MD  [facility] Answers submitted by the patient for this visit:  Primary Reason for Visit (Submitted on 9/26/2023)  What is the primary reason for your visit?: Physical

## 2023-09-29 ENCOUNTER — TELEPHONE (OUTPATIENT)
Dept: FAMILY MEDICINE CLINIC | Facility: CLINIC | Age: 53
End: 2023-09-29
Payer: COMMERCIAL

## 2023-09-29 LAB
ALBUMIN SERPL-MCNC: 4.5 G/DL (ref 3.8–4.9)
ALBUMIN/GLOB SERPL: 1.4 {RATIO} (ref 1.2–2.2)
ALP SERPL-CCNC: 89 IU/L (ref 44–121)
ALT SERPL-CCNC: 100 IU/L (ref 0–44)
AST SERPL-CCNC: 88 IU/L (ref 0–40)
BILIRUB SERPL-MCNC: 0.4 MG/DL (ref 0–1.2)
BUN SERPL-MCNC: 9 MG/DL (ref 6–24)
BUN/CREAT SERPL: 10 (ref 9–20)
CALCIUM SERPL-MCNC: 9.1 MG/DL (ref 8.7–10.2)
CHLORIDE SERPL-SCNC: 103 MMOL/L (ref 96–106)
CHOLEST SERPL-MCNC: 213 MG/DL (ref 100–199)
CO2 SERPL-SCNC: 20 MMOL/L (ref 20–29)
CREAT SERPL-MCNC: 0.88 MG/DL (ref 0.76–1.27)
EGFRCR SERPLBLD CKD-EPI 2021: 104 ML/MIN/1.73
GLOBULIN SER CALC-MCNC: 3.3 G/DL (ref 1.5–4.5)
GLUCOSE SERPL-MCNC: 140 MG/DL (ref 70–99)
HBV CORE AB SERPL QL IA: NEGATIVE
HBV SURFACE AB SER QL: NON REACTIVE
HBV SURFACE AG SERPL QL IA: NEGATIVE
HCV AB SERPL QL IA: NORMAL
HCV IGG SERPL QL IA: NON REACTIVE
HDLC SERPL-MCNC: 48 MG/DL
LDLC SERPL CALC-MCNC: 103 MG/DL (ref 0–99)
MICROALBUMIN UR-MCNC: <3 UG/ML
POTASSIUM SERPL-SCNC: 4.4 MMOL/L (ref 3.5–5.2)
PROT SERPL-MCNC: 7.8 G/DL (ref 6–8.5)
PSA SERPL-MCNC: 0.4 NG/ML (ref 0–4)
SODIUM SERPL-SCNC: 138 MMOL/L (ref 134–144)
TRIGL SERPL-MCNC: 370 MG/DL (ref 0–149)
VLDLC SERPL CALC-MCNC: 62 MG/DL (ref 5–40)

## 2023-09-29 NOTE — TELEPHONE ENCOUNTER
Phone conversation with patient regarding results of laboratory testing from 9/20/2023 as follows:    Hepatitis B and C testing are negative for acute or chronic infection  CMP reveals a creatinine of 0.88, glucose 140, AST 88 with normal range 0-40,  with normal range 0-44, noting previous AST was 55 and ALT of 68, total bilirubin normal at 0.4 with alk phos normal at 89  PSA normal at 0.4  Urine albumin normal at less than 3.0  Total cholesterol 213, triglyceride 370, HDL 48,  versus previous total cholesterol 286, triglyceride 304, HDL 42 and     Assessment/plan:  1.  Elevated liver transaminases.  Normal viral hepatitis profile for hepatitis B and C.  Very likely related either to his alcohol consumption versus steatohepatitis versus combination of the two.  I did advise that we obtain a right upper quadrant/liver ultrasound to assess for any evidence of steatohepatitis but he wishes to hold off for now and work on lifestyle change including weight loss, and optimally duction in his alcohol consumption.  We will repeat liver transaminases in 3 months at his follow-up visit make decision whether or not to pursue an ultrasound at that time.  2.  Dyslipidemia, with significant improvement taking Lipitor 20 mg nightly.  Continue current regimen with efforts at lifestyle modification we will repeat another lipid profile within a year.  3.  Remainder of laboratory testing otherwise satisfactory.